# Patient Record
Sex: FEMALE | Race: WHITE | URBAN - METROPOLITAN AREA
[De-identification: names, ages, dates, MRNs, and addresses within clinical notes are randomized per-mention and may not be internally consistent; named-entity substitution may affect disease eponyms.]

---

## 2022-09-19 ENCOUNTER — TRANSCRIBE ORDERS (OUTPATIENT)
Dept: ADMINISTRATIVE | Facility: HOSPITAL | Age: 42
End: 2022-09-19

## 2022-09-19 DIAGNOSIS — R23.8 REDNESS AND SWELLING OF LOWER LEG: Primary | ICD-10-CM

## 2022-09-19 DIAGNOSIS — M79.89 REDNESS AND SWELLING OF LOWER LEG: Primary | ICD-10-CM

## 2024-03-05 NOTE — PROGRESS NOTES
"NEW PATIENT GYN VISIT    Chief Complaint   Patient presents with    Follow-up     Abnormal Pap        HPI:   43 y.o. LMP: No LMP recorded. (Menstrual status: Other).  Patient presents to discuss recent abnormal Pap smear.  Pap result was L GAUDENCIO, unable to rule out high-grade, HPV positive.  Patient states she has never had an abnormal Pap in the past.      Social History     Substance and Sexual Activity   Sexual Activity Yes    Partners: Male    Birth control/protection: Birth control pill       History: PMHx, Meds, Allergies, PSHx, Social Hx, and POBHx all reviewed and updated.  Last Completed Pap Smear       This patient has no relevant Health Maintenance data.            PHYSICAL EXAM:  /79   Pulse 86   Ht 165.1 cm (65\")   Wt 98.3 kg (216 lb 12.8 oz)   BMI 36.08 kg/m²   General- NAD, alert and oriented, appropriate  Psych- Normal mood  Respiratory- No labored breathing  Abdomen- Soft, non distended, non tender  Extremities- No edema  Skin- No lesions, no rashes      ASSESSMENT AND PLAN:  Diagnoses and all orders for this visit:    1. Atypical squamous cells cannot exclude high grade squamous intraepithelial lesion on cytologic smear of cervix (ASC-H) (Primary)    Discussed Pap results with the patient.  Per ASCCP guidelines recommend a colposcopy.  Patient has never had an abnormal Pap or colposcopy.  We discussed the procedure in detail.  We also discussed possible treatment options in the future if needed.  Patient expressed understanding.  All questions were answered.        Follow Up:  Return for Colposcopy.        Gissel Mon DO  2024    [facility]  [facility] [place] [address]  Dept: [phone]  Dept Fax: [phone]  Loc: [phone]  Loc Fax: [phone]     "

## 2024-03-06 ENCOUNTER — OFFICE VISIT (OUTPATIENT)
Dept: OBSTETRICS AND GYNECOLOGY | Facility: CLINIC | Age: 44
End: 2024-03-06
Payer: COMMERCIAL

## 2024-03-06 VITALS
SYSTOLIC BLOOD PRESSURE: 117 MMHG | BODY MASS INDEX: 36.12 KG/M2 | WEIGHT: 216.8 LBS | HEIGHT: 65 IN | DIASTOLIC BLOOD PRESSURE: 79 MMHG | HEART RATE: 86 BPM

## 2024-03-06 DIAGNOSIS — R87.611 ATYPICAL SQUAMOUS CELLS CANNOT EXCLUDE HIGH GRADE SQUAMOUS INTRAEPITHELIAL LESION ON CYTOLOGIC SMEAR OF CERVIX (ASC-H): Primary | ICD-10-CM

## 2024-03-06 RX ORDER — CYCLOBENZAPRINE HCL 5 MG
5 TABLET ORAL 2 TIMES DAILY PRN
COMMUNITY
Start: 2024-01-04

## 2024-03-06 RX ORDER — BUSPIRONE HYDROCHLORIDE 10 MG/1
10 TABLET ORAL 3 TIMES DAILY
COMMUNITY
Start: 2024-01-11

## 2024-03-06 RX ORDER — SUMATRIPTAN 100 MG/1
100 TABLET, FILM COATED ORAL ONCE AS NEEDED
COMMUNITY
Start: 2024-02-05

## 2024-03-06 RX ORDER — CETIRIZINE HYDROCHLORIDE 10 MG/1
1 TABLET ORAL DAILY
COMMUNITY
Start: 2024-01-09

## 2024-03-06 RX ORDER — ROPINIROLE 0.25 MG/1
0.5 TABLET, FILM COATED ORAL
COMMUNITY
Start: 2024-01-11

## 2024-03-06 RX ORDER — DESVENLAFAXINE SUCCINATE 50 MG/1
50 TABLET, EXTENDED RELEASE ORAL DAILY
COMMUNITY
Start: 2024-01-11

## 2024-03-06 RX ORDER — LIFITEGRAST 50 MG/ML
1 SOLUTION/ DROPS OPHTHALMIC 2 TIMES DAILY
COMMUNITY
Start: 2024-02-08

## 2024-03-06 RX ORDER — NORETHINDRONE ACETATE AND ETHINYL ESTRADIOL .02; 1 MG/1; MG/1
1 TABLET ORAL DAILY
COMMUNITY
Start: 2024-01-29

## 2024-03-06 RX ORDER — ALBUTEROL SULFATE 90 UG/1
1 AEROSOL, METERED RESPIRATORY (INHALATION)
COMMUNITY
Start: 2024-03-05

## 2024-03-08 ENCOUNTER — PATIENT ROUNDING (BHMG ONLY) (OUTPATIENT)
Dept: OBSTETRICS AND GYNECOLOGY | Facility: CLINIC | Age: 44
End: 2024-03-08
Payer: COMMERCIAL

## 2024-03-08 NOTE — PROGRESS NOTES
A My-Chart message has been sent to the patient for PATIENT ROUNDING with [facility].

## 2024-04-04 NOTE — PROGRESS NOTES
"Colposcopy    CC:  Pt presents for colposcopy  Chief Complaint   Patient presents with    Colposcopy       Pregnant: No  Birth control: Birth control pills  Tobacco/Nicotine use:  No  Pap result: LGSIL, HPV HIGH RISK POSITIVE- non specified type  Uhcg:  Negative  Consent signed: Yes    Procedure reviewed in detail.  She understands the potential risks include, but are not limited to, pain, bleeding, and infection.  Her questions have been answered.    Subjective/HPI:Patient is a 43 y.o. female with abnormal pap smear results L GAUDENCIO cannot rule out high-grade, HPV other high-risk positive.    Objective:  /80   Pulse 83   Ht 165.1 cm (65\")   Wt 97.5 kg (215 lb)   BMI 35.78 kg/m²   External genitalia- Without lesion   Perineum- Without lesion  Vagina- Without lesion   Cvx- Adequate 100%TZ seen, Biopsies taken at lesion site/s 3 o'clock, ECC performed, Monsels for hemostasis  Physical Exam  Patient tolerated the procedure well.    Biopsy: 3:00    ECC:  yes    T Zone seen:  yes    Findings:  acetowhite lesion(s) noted at 3:00 o'clock and along the transition zone    OBGyn Exam        Assessment and Plan:  Diagnoses and all orders for this visit:    1. Low grade squamous intraepithelial lesion on cytologic smear of cervix (LGSIL) (Primary)  -     POC Pregnancy, Urine  -     Tissue Pathology Exam    2. LGSIL on Pap smear of cervix  -     POC Pregnancy, Urine  -     Tissue Pathology Exam    3. Papanicolaou smear of cervix with positive high risk human papilloma virus (HPV) test  -     POC Pregnancy, Urine  -     Tissue Pathology Exam        Counseling:    We discussed her Pap diagnosis and HPV in detail.  HPV incidence, transmission, course, remission, progression, types, cervical cancer, genital warts, monitoring, and importance of compliance were reviewed.  Importance of maintaining a healthy lifestyle    She understands the need for continued follow up until she is cleared to return to routine screening pap " smears.  She understands the importance of follow up and consequences with lack of follow up (i.e. potential for cervical cancer).  Follow up usually ranges every 6months - 12months.      PRECAUTIONS - It is common to have bright red spotting and dark discharge after today.  If she has had cervical biopsies, she is to avoid intercourse or tampons as directed for 7 days.  She can use OTC pain relievers prn.  She needs to return to office or ER (if after hours/weekends) if she has pelvic pain, bleeding > 1 pad/2 hours, discharge that has a bad vaginal odor or vaginal itching, fever > 101.5, or any other concerns.          Follow Up:  Return if symptoms worsen or fail to improve.      Gissel Mon DO  04/08/2024    [facility]  [address] [place] [address]  Dept: [phone]  Dept Fax: [phone]  Loc: [phone]  Loc Fax: [phone]

## 2024-04-08 ENCOUNTER — PROCEDURE VISIT (OUTPATIENT)
Dept: OBSTETRICS AND GYNECOLOGY | Facility: CLINIC | Age: 44
End: 2024-04-08
Payer: COMMERCIAL

## 2024-04-08 VITALS
WEIGHT: 215 LBS | HEIGHT: 65 IN | SYSTOLIC BLOOD PRESSURE: 114 MMHG | HEART RATE: 83 BPM | DIASTOLIC BLOOD PRESSURE: 80 MMHG | BODY MASS INDEX: 35.82 KG/M2

## 2024-04-08 DIAGNOSIS — R87.612 LGSIL ON PAP SMEAR OF CERVIX: ICD-10-CM

## 2024-04-08 DIAGNOSIS — R87.612 LOW GRADE SQUAMOUS INTRAEPITHELIAL LESION ON CYTOLOGIC SMEAR OF CERVIX (LGSIL): Primary | ICD-10-CM

## 2024-04-08 DIAGNOSIS — R87.810 PAPANICOLAOU SMEAR OF CERVIX WITH POSITIVE HIGH RISK HUMAN PAPILLOMA VIRUS (HPV) TEST: ICD-10-CM

## 2024-04-08 LAB
B-HCG UR QL: NEGATIVE
EXPIRATION DATE: NORMAL
INTERNAL NEGATIVE CONTROL: NORMAL
INTERNAL POSITIVE CONTROL: NORMAL
Lab: NORMAL

## 2024-04-08 PROCEDURE — 81025 URINE PREGNANCY TEST: CPT | Performed by: STUDENT IN AN ORGANIZED HEALTH CARE EDUCATION/TRAINING PROGRAM

## 2024-04-08 PROCEDURE — 88305 TISSUE EXAM BY PATHOLOGIST: CPT | Performed by: STUDENT IN AN ORGANIZED HEALTH CARE EDUCATION/TRAINING PROGRAM

## 2024-04-08 PROCEDURE — 57455 BIOPSY OF CERVIX W/SCOPE: CPT | Performed by: STUDENT IN AN ORGANIZED HEALTH CARE EDUCATION/TRAINING PROGRAM

## 2024-04-08 RX ORDER — BUTALBITAL, ACETAMINOPHEN AND CAFFEINE 300; 40; 50 MG/1; MG/1; MG/1
CAPSULE ORAL
COMMUNITY
Start: 2024-01-16

## 2024-04-08 RX ORDER — HYDROXYZINE HYDROCHLORIDE 10 MG/1
TABLET, FILM COATED ORAL
COMMUNITY
Start: 2024-03-25

## 2024-04-08 RX ORDER — BUSPIRONE HYDROCHLORIDE 15 MG/1
15 TABLET ORAL 2 TIMES DAILY
COMMUNITY
Start: 2024-03-25

## 2024-04-11 LAB
CYTO UR: NORMAL
LAB AP CASE REPORT: NORMAL
LAB AP CLINICAL INFORMATION: NORMAL
PATH REPORT.FINAL DX SPEC: NORMAL
PATH REPORT.GROSS SPEC: NORMAL

## 2024-09-24 ENCOUNTER — OFFICE VISIT (OUTPATIENT)
Dept: OBSTETRICS AND GYNECOLOGY | Facility: CLINIC | Age: 44
End: 2024-09-24
Payer: COMMERCIAL

## 2024-09-24 VITALS
SYSTOLIC BLOOD PRESSURE: 113 MMHG | DIASTOLIC BLOOD PRESSURE: 76 MMHG | BODY MASS INDEX: 35.61 KG/M2 | HEART RATE: 91 BPM | WEIGHT: 214 LBS

## 2024-09-24 DIAGNOSIS — Z30.41 ENCOUNTER FOR SURVEILLANCE OF CONTRACEPTIVE PILLS: Primary | ICD-10-CM

## 2024-09-24 DIAGNOSIS — L70.0 ACNE VULGARIS: ICD-10-CM

## 2024-09-24 DIAGNOSIS — L68.0 FEMALE HIRSUTISM: ICD-10-CM

## 2024-09-24 PROCEDURE — 99214 OFFICE O/P EST MOD 30 MIN: CPT | Performed by: NURSE PRACTITIONER

## 2024-09-24 PROCEDURE — 1160F RVW MEDS BY RX/DR IN RCRD: CPT | Performed by: NURSE PRACTITIONER

## 2024-09-24 PROCEDURE — 1159F MED LIST DOCD IN RCRD: CPT | Performed by: NURSE PRACTITIONER

## 2024-09-24 RX ORDER — DROSPIRENONE 4 MG/1
4 TABLET, FILM COATED ORAL DAILY
Qty: 84 TABLET | Refills: 3 | Status: SHIPPED | OUTPATIENT
Start: 2024-09-24

## 2024-09-30 ENCOUNTER — LAB (OUTPATIENT)
Dept: LAB | Facility: HOSPITAL | Age: 44
End: 2024-09-30
Payer: COMMERCIAL

## 2024-09-30 DIAGNOSIS — L68.0 FEMALE HIRSUTISM: ICD-10-CM

## 2024-09-30 DIAGNOSIS — L70.0 ACNE VULGARIS: ICD-10-CM

## 2024-09-30 LAB
DEPRECATED RDW RBC AUTO: 40.4 FL (ref 37–54)
ERYTHROCYTE [DISTWIDTH] IN BLOOD BY AUTOMATED COUNT: 11.9 % (ref 12.3–15.4)
GLUCOSE P FAST SERPL-MCNC: 89 MG/DL (ref 74–106)
HCT VFR BLD AUTO: 42 % (ref 34–46.6)
HGB BLD-MCNC: 14.1 G/DL (ref 12–15.9)
MCH RBC QN AUTO: 30.9 PG (ref 26.6–33)
MCHC RBC AUTO-ENTMCNC: 33.6 G/DL (ref 31.5–35.7)
MCV RBC AUTO: 92.1 FL (ref 79–97)
PLATELET # BLD AUTO: 272 10*3/MM3 (ref 140–450)
PMV BLD AUTO: 10.2 FL (ref 6–12)
PROLACTIN SERPL-MCNC: 8.98 NG/ML (ref 4.79–23.3)
RBC # BLD AUTO: 4.56 10*6/MM3 (ref 3.77–5.28)
TESTOST SERPL-MCNC: 32.2 NG/DL (ref 8.4–48.1)
TSH SERPL DL<=0.05 MIU/L-ACNC: 2.78 UIU/ML (ref 0.27–4.2)
WBC NRBC COR # BLD AUTO: 5.98 10*3/MM3 (ref 3.4–10.8)

## 2024-09-30 PROCEDURE — 85027 COMPLETE CBC AUTOMATED: CPT

## 2024-09-30 PROCEDURE — 83525 ASSAY OF INSULIN: CPT

## 2024-09-30 PROCEDURE — 82627 DEHYDROEPIANDROSTERONE: CPT

## 2024-09-30 PROCEDURE — 84146 ASSAY OF PROLACTIN: CPT

## 2024-09-30 PROCEDURE — 36415 COLL VENOUS BLD VENIPUNCTURE: CPT

## 2024-09-30 PROCEDURE — 84443 ASSAY THYROID STIM HORMONE: CPT

## 2024-09-30 PROCEDURE — 83498 ASY HYDROXYPROGESTERONE 17-D: CPT

## 2024-09-30 PROCEDURE — 82947 ASSAY GLUCOSE BLOOD QUANT: CPT

## 2024-09-30 PROCEDURE — 84403 ASSAY OF TOTAL TESTOSTERONE: CPT

## 2024-10-01 LAB
DHEA-S SERPL-MCNC: 174 UG/DL (ref 57.3–279.2)
INSULIN SERPL-ACNC: 7.3 UIU/ML (ref 2.6–24.9)

## 2024-10-04 LAB — 17OHP SERPL-MCNC: 21 NG/DL

## 2024-10-14 ENCOUNTER — TELEPHONE (OUTPATIENT)
Dept: OBSTETRICS AND GYNECOLOGY | Facility: CLINIC | Age: 44
End: 2024-10-14
Payer: COMMERCIAL

## 2024-10-14 NOTE — TELEPHONE ENCOUNTER
----- Message from Cait Ramirez sent at 10/1/2024  8:52 AM EDT -----  Your labwork is normal, follow up if menses occur frequent, heavy , prolonged or painful once starting slynd, when transitioning from CHC to progestin only may experience bleeding irregularity for the first 1-2 packs, follow up with any concerns.

## 2024-10-14 NOTE — TELEPHONE ENCOUNTER
The patient has reviewed this lab result in "[employer] so I have sent her a message through there.

## 2024-10-16 NOTE — TELEPHONE ENCOUNTER
The patient has reviewed the result and recommendations that I sent her in [employer] and doesn't have any questions at this time. She will call back if she has any concerns in the future.

## 2024-11-14 DIAGNOSIS — Z30.41 ENCOUNTER FOR SURVEILLANCE OF CONTRACEPTIVE PILLS: ICD-10-CM

## 2024-11-14 RX ORDER — DROSPIRENONE 4 MG/1
4 TABLET, FILM COATED ORAL DAILY
Qty: 84 TABLET | Refills: 3 | Status: SHIPPED | OUTPATIENT
Start: 2024-11-14